# Patient Record
Sex: FEMALE | Race: WHITE
[De-identification: names, ages, dates, MRNs, and addresses within clinical notes are randomized per-mention and may not be internally consistent; named-entity substitution may affect disease eponyms.]

---

## 2020-01-14 ENCOUNTER — HOSPITAL ENCOUNTER (OUTPATIENT)
Dept: HOSPITAL 75 - PREOP | Age: 80
LOS: 2 days | Discharge: HOME | End: 2020-01-16
Attending: SPECIALIST
Payer: MEDICARE

## 2020-01-14 VITALS — BODY MASS INDEX: 24.87 KG/M2 | HEIGHT: 61.81 IN | WEIGHT: 135.14 LBS

## 2020-01-14 DIAGNOSIS — Z01.818: Primary | ICD-10-CM

## 2020-01-17 ENCOUNTER — HOSPITAL ENCOUNTER (OUTPATIENT)
Dept: HOSPITAL 75 - SDC | Age: 80
Discharge: HOME | End: 2020-01-17
Attending: SPECIALIST
Payer: MEDICARE

## 2020-01-17 VITALS — SYSTOLIC BLOOD PRESSURE: 141 MMHG | DIASTOLIC BLOOD PRESSURE: 78 MMHG

## 2020-01-17 VITALS — HEIGHT: 60.98 IN | BODY MASS INDEX: 25.52 KG/M2 | WEIGHT: 135.14 LBS

## 2020-01-17 DIAGNOSIS — Z79.82: ICD-10-CM

## 2020-01-17 DIAGNOSIS — Z87.891: ICD-10-CM

## 2020-01-17 DIAGNOSIS — H26.491: Primary | ICD-10-CM

## 2020-01-17 RX ADMIN — TETRACAINE HYDROCHLORIDE PRN ML: 5 SOLUTION OPHTHALMIC at 10:37

## 2020-01-17 RX ADMIN — TETRACAINE HYDROCHLORIDE PRN ML: 5 SOLUTION OPHTHALMIC at 10:50

## 2020-01-17 RX ADMIN — TETRACAINE HYDROCHLORIDE PRN ML: 5 SOLUTION OPHTHALMIC at 10:44

## 2020-01-17 NOTE — OPHTHALMOLOGIST PRE-OP NOTE
Pre-Operative Progress Note


H&P Reviewed


The H&P was reviewed, patient examined and no changes noted.


Date H&P Reviewed:  Jan 17, 2020


Time H&P Reviewed:  11:17


Pre-Op Dx


Secondary Cataract, Right Eye











LUZ POMPA MD             Jan 17, 2020 11:24

## 2020-01-17 NOTE — OPHTHALMOLOGY OPERATIVE REPORT
YAG Capsulotomy


PREOPERATIVE DIAGNOSIS:    Secondary Cataract Right Eye


POSTOPERATIVE DIAGNOSIS: Secondary Cataract Right Eye





PROCEDURE: YAG Capsulotomy, right eye





SURGEON: Trace Pompa 





ANESTHESIA: Topical anesthesia





COMPLICATIONS: None





ESTIMATED BLOOD LOSS: Minimal 





DESCRIPTION OF PROCEDURE:


After proper informed consent was obtained, the patient's, a 79 female, right 

eye received one drop of Tropicamide and one drop of Tetracaine. The patient was

then placed at the YAG laser and using a power of [ 3.2] millijoules and [16 ] 

bursts were used to fashion a central capsulotomy. The patient tolerated the 

procedure well without complications.











TRACE POMPA MD             Jan 17, 2020 11:25